# Patient Record
Sex: FEMALE | Race: OTHER | Employment: OTHER | URBAN - METROPOLITAN AREA
[De-identification: names, ages, dates, MRNs, and addresses within clinical notes are randomized per-mention and may not be internally consistent; named-entity substitution may affect disease eponyms.]

---

## 2021-09-27 ENCOUNTER — NEW PATIENT COMPREHENSIVE (OUTPATIENT)
Dept: URBAN - METROPOLITAN AREA CLINIC 39 | Facility: CLINIC | Age: 71
End: 2021-09-27

## 2021-09-27 DIAGNOSIS — H40.1134: ICD-10-CM

## 2021-09-27 DIAGNOSIS — H04.123: ICD-10-CM

## 2021-09-27 DIAGNOSIS — H25.813: ICD-10-CM

## 2021-09-27 DIAGNOSIS — H43.393: ICD-10-CM

## 2021-09-27 PROCEDURE — 92250 FUNDUS PHOTOGRAPHY W/I&R: CPT

## 2021-09-27 PROCEDURE — 92015 DETERMINE REFRACTIVE STATE: CPT

## 2021-09-27 PROCEDURE — 92004 COMPRE OPH EXAM NEW PT 1/>: CPT

## 2021-09-27 ASSESSMENT — VISUAL ACUITY
OS_SC: <J12
OS_CC: J6
OS_CC: 20/40
OS_SC: 20/200
OD_SC: 20/200
OD_CC: 20/50+1
OD_SC: <J12
OD_CC: J4

## 2021-09-27 ASSESSMENT — TONOMETRY
OD_IOP_MMHG: 15
OS_IOP_MMHG: 10

## 2021-10-15 ENCOUNTER — CATARACT CONSULT (OUTPATIENT)
Dept: URBAN - METROPOLITAN AREA CLINIC 39 | Facility: CLINIC | Age: 71
End: 2021-10-15

## 2021-10-15 DIAGNOSIS — H25.812: ICD-10-CM

## 2021-10-15 DIAGNOSIS — H18.513: ICD-10-CM

## 2021-10-15 DIAGNOSIS — Z83.511: ICD-10-CM

## 2021-10-15 DIAGNOSIS — H04.123: ICD-10-CM

## 2021-10-15 DIAGNOSIS — S05.12XS: ICD-10-CM

## 2021-10-15 DIAGNOSIS — H40.1132: ICD-10-CM

## 2021-10-15 DIAGNOSIS — H35.30: ICD-10-CM

## 2021-10-15 DIAGNOSIS — H43.393: ICD-10-CM

## 2021-10-15 DIAGNOSIS — H25.811: ICD-10-CM

## 2021-10-15 PROCEDURE — 92014 COMPRE OPH EXAM EST PT 1/>: CPT

## 2021-10-15 PROCEDURE — 92136TC INTERFEROMETRY - TECHNICAL COMPONENT

## 2021-10-15 PROCEDURE — 92025-3 CORNEAL TOPO, REFUSED

## 2021-10-15 PROCEDURE — 92134 CPTRZ OPH DX IMG PST SGM RTA: CPT

## 2021-10-15 PROCEDURE — 92286 ANT SGM IMG I&R SPECLR MIC: CPT

## 2021-10-15 RX ORDER — PREDNISOLONE ACETATE 10 MG/ML: 1 SUSPENSION/ DROPS OPHTHALMIC

## 2021-10-15 RX ORDER — KETOROLAC TROMETHAMINE 5 MG/ML: 1 SOLUTION OPHTHALMIC

## 2021-10-15 ASSESSMENT — TONOMETRY
OD_IOP_MMHG: 12
OS_IOP_MMHG: 10

## 2021-10-15 ASSESSMENT — VISUAL ACUITY
OS_CC: J6
OD_SC: <J12
OD_CC: J6
OS_BAT: 20/400
OD_RAM: 20/25
OS_SC: 20/400
OD_BAT: 20/400
OS_AM: 20/30
OS_CC: 20/50
OD_SC: 20/400
OD_CC: 20/50
OS_SC: <J12

## 2021-10-27 ENCOUNTER — PRE-OP/H&P (OUTPATIENT)
Dept: URBAN - METROPOLITAN AREA CLINIC 39 | Facility: CLINIC | Age: 71
End: 2021-10-27

## 2021-10-27 ENCOUNTER — SURGERY/PROCEDURE (OUTPATIENT)
Dept: URBAN - METROPOLITAN AREA CLINIC 39 | Facility: CLINIC | Age: 71
End: 2021-10-27

## 2021-10-27 DIAGNOSIS — H25.812: ICD-10-CM

## 2021-10-27 PROCEDURE — 99211T TECH SERVICE

## 2021-10-27 PROCEDURE — 66984 XCAPSL CTRC RMVL W/O ECP: CPT

## 2021-10-28 ENCOUNTER — POST OP/EVAL OF SECOND EYE (OUTPATIENT)
Dept: URBAN - METROPOLITAN AREA CLINIC 39 | Facility: CLINIC | Age: 71
End: 2021-10-28

## 2021-10-28 DIAGNOSIS — H35.30: ICD-10-CM

## 2021-10-28 DIAGNOSIS — H40.1132: ICD-10-CM

## 2021-10-28 DIAGNOSIS — H25.811: ICD-10-CM

## 2021-10-28 DIAGNOSIS — Z96.1: ICD-10-CM

## 2021-10-28 DIAGNOSIS — H18.513: ICD-10-CM

## 2021-10-28 DIAGNOSIS — H04.123: ICD-10-CM

## 2021-10-28 DIAGNOSIS — H43.393: ICD-10-CM

## 2021-10-28 PROCEDURE — 99024 POSTOP FOLLOW-UP VISIT: CPT

## 2021-10-28 PROCEDURE — 92012 INTRM OPH EXAM EST PATIENT: CPT

## 2021-10-28 ASSESSMENT — VISUAL ACUITY
OS_SC: 20/80
OD_BAT: 20/400
OS_SC: <J10
OD_SC: 20/400
OD_SC: <J12

## 2021-10-28 ASSESSMENT — TONOMETRY
OD_IOP_MMHG: 12
OS_IOP_MMHG: 13

## 2021-11-03 ENCOUNTER — SURGERY/PROCEDURE (OUTPATIENT)
Dept: URBAN - METROPOLITAN AREA CLINIC 39 | Facility: CLINIC | Age: 71
End: 2021-11-03

## 2021-11-03 ENCOUNTER — PRE-OP/H&P (OUTPATIENT)
Dept: URBAN - METROPOLITAN AREA CLINIC 39 | Facility: CLINIC | Age: 71
End: 2021-11-03

## 2021-11-03 DIAGNOSIS — Z96.1: ICD-10-CM

## 2021-11-03 DIAGNOSIS — H25.811: ICD-10-CM

## 2021-11-03 PROCEDURE — 66984 XCAPSL CTRC RMVL W/O ECP: CPT

## 2021-11-03 PROCEDURE — 99211T TECH SERVICE

## 2021-11-03 ASSESSMENT — TONOMETRY
OS_IOP_MMHG: 16
OD_IOP_MMHG: 14

## 2021-11-03 ASSESSMENT — VISUAL ACUITY
OD_SC: >J12
OD_SC: 20/400
OS_SC: J12
OS_SC: 20/50+1
OD_BAT: 20/400

## 2021-11-04 ENCOUNTER — CATARACT POST-OP 1-DAY (OUTPATIENT)
Dept: URBAN - METROPOLITAN AREA CLINIC 39 | Facility: CLINIC | Age: 71
End: 2021-11-04

## 2021-11-04 DIAGNOSIS — Z96.1: ICD-10-CM

## 2021-11-04 PROCEDURE — 99024 POSTOP FOLLOW-UP VISIT: CPT

## 2021-11-04 ASSESSMENT — VISUAL ACUITY
OS_SC: <J12
OD_SC: <J12
OD_SC: 20/400+1
OS_SC: 20/40

## 2021-11-04 ASSESSMENT — TONOMETRY
OD_IOP_MMHG: 10
OS_IOP_MMHG: 12

## 2021-12-03 ENCOUNTER — POST-OP CATARACT (OUTPATIENT)
Dept: URBAN - METROPOLITAN AREA CLINIC 39 | Facility: CLINIC | Age: 71
End: 2021-12-03

## 2021-12-03 DIAGNOSIS — Z96.1: ICD-10-CM

## 2021-12-03 PROCEDURE — 99024 POSTOP FOLLOW-UP VISIT: CPT

## 2021-12-03 ASSESSMENT — TONOMETRY
OS_IOP_MMHG: 11
OD_IOP_MMHG: 11

## 2021-12-03 ASSESSMENT — VISUAL ACUITY
OS_SC: 20/40
OD_SC: 20/50-1
OD_SC: J12
OU_SC: 20/30

## 2022-04-14 ENCOUNTER — FOLLOW UP (OUTPATIENT)
Dept: URBAN - METROPOLITAN AREA CLINIC 39 | Facility: CLINIC | Age: 72
End: 2022-04-14

## 2022-04-14 DIAGNOSIS — H02.403: ICD-10-CM

## 2022-04-14 DIAGNOSIS — H04.123: ICD-10-CM

## 2022-04-14 DIAGNOSIS — H40.1132: ICD-10-CM

## 2022-04-14 PROCEDURE — 92012 INTRM OPH EXAM EST PATIENT: CPT

## 2022-04-14 PROCEDURE — 92083 EXTENDED VISUAL FIELD XM: CPT

## 2022-04-14 RX ORDER — BRIMONIDINE TARTRATE, TIMOLOL MALEATE 2; 5 MG/ML; MG/ML: 1 SOLUTION/ DROPS OPHTHALMIC TWICE A DAY

## 2022-04-14 ASSESSMENT — TONOMETRY
OD_IOP_MMHG: 16
OS_IOP_MMHG: 12
OS_IOP_MMHG: 15
OD_IOP_MMHG: 11

## 2022-04-14 ASSESSMENT — VISUAL ACUITY
OS_CC: 20/30-2
OD_CC: 20/40-1

## 2022-04-20 ENCOUNTER — FOLLOW UP (OUTPATIENT)
Dept: URBAN - METROPOLITAN AREA CLINIC 38 | Facility: CLINIC | Age: 72
End: 2022-04-20

## 2022-05-16 ENCOUNTER — ESTABLISHED PATIENT (OUTPATIENT)
Dept: URBAN - METROPOLITAN AREA CLINIC 39 | Facility: CLINIC | Age: 72
End: 2022-05-16

## 2022-05-16 DIAGNOSIS — H02.403: ICD-10-CM

## 2022-05-16 DIAGNOSIS — H02.832: ICD-10-CM

## 2022-05-16 DIAGNOSIS — H02.835: ICD-10-CM

## 2022-05-16 PROCEDURE — 92285 EXTERNAL OCULAR PHOTOGRAPHY: CPT

## 2022-05-16 PROCEDURE — 99213 OFFICE O/P EST LOW 20 MIN: CPT

## 2022-05-16 ASSESSMENT — VISUAL ACUITY
OD_CC: 20/40-1
OS_CC: 20/30-2

## 2022-06-06 ENCOUNTER — TECH ONLY (OUTPATIENT)
Dept: URBAN - METROPOLITAN AREA CLINIC 39 | Facility: CLINIC | Age: 72
End: 2022-06-06

## 2022-06-06 DIAGNOSIS — H02.403: ICD-10-CM

## 2022-06-06 DIAGNOSIS — H04.123: ICD-10-CM

## 2022-06-06 DIAGNOSIS — S05.12XS: ICD-10-CM

## 2022-06-06 DIAGNOSIS — H02.832: ICD-10-CM

## 2022-06-06 DIAGNOSIS — H02.835: ICD-10-CM

## 2022-06-06 DIAGNOSIS — H40.1132: ICD-10-CM

## 2022-06-06 PROCEDURE — 99211T TECH SERVICE

## 2022-06-06 PROCEDURE — 92082 INTERMEDIATE VISUAL FIELD XM: CPT

## 2022-06-08 ENCOUNTER — FOLLOW UP (OUTPATIENT)
Dept: URBAN - METROPOLITAN AREA CLINIC 39 | Facility: CLINIC | Age: 72
End: 2022-06-08

## 2022-06-08 DIAGNOSIS — S05.12XS: ICD-10-CM

## 2022-06-08 DIAGNOSIS — H40.1132: ICD-10-CM

## 2022-06-08 DIAGNOSIS — H04.123: ICD-10-CM

## 2022-06-08 PROCEDURE — 92012 INTRM OPH EXAM EST PATIENT: CPT

## 2022-06-08 ASSESSMENT — TONOMETRY
OD_IOP_MMHG: 16
OS_IOP_MMHG: 15
OS_IOP_MMHG: 16

## 2022-06-08 ASSESSMENT — VISUAL ACUITY
OU_CC: 20/25
OS_CC: 20/25-1
OD_CC: 20/30-1

## 2022-08-03 ENCOUNTER — PRE-OP/H&P (OUTPATIENT)
Dept: URBAN - METROPOLITAN AREA CLINIC 39 | Facility: CLINIC | Age: 72
End: 2022-08-03

## 2022-08-03 DIAGNOSIS — H02.403: ICD-10-CM

## 2022-08-03 PROCEDURE — 99211HP H&P OFFICE/OUTPATIENT VISIT, EST

## 2022-08-18 ENCOUNTER — POST-OP (OUTPATIENT)
Dept: URBAN - METROPOLITAN AREA CLINIC 39 | Facility: CLINIC | Age: 72
End: 2022-08-18

## 2022-08-18 DIAGNOSIS — H02.403: ICD-10-CM

## 2022-08-18 DIAGNOSIS — H04.123: ICD-10-CM

## 2022-08-18 DIAGNOSIS — H02.835: ICD-10-CM

## 2022-08-18 DIAGNOSIS — H02.832: ICD-10-CM

## 2022-08-18 DIAGNOSIS — H40.1132: ICD-10-CM

## 2022-08-18 PROCEDURE — 92012 INTRM OPH EXAM EST PATIENT: CPT

## 2022-08-18 PROCEDURE — 92083 EXTENDED VISUAL FIELD XM: CPT

## 2022-08-18 ASSESSMENT — VISUAL ACUITY
OU_CC: 20/30
OD_CC: 20/30
OS_CC: 20/30

## 2022-08-18 ASSESSMENT — TONOMETRY
OD_IOP_MMHG: 14
OS_IOP_MMHG: 13
OD_IOP_MMHG: 13

## 2022-08-30 ENCOUNTER — PRE-OP/H&P (OUTPATIENT)
Dept: URBAN - METROPOLITAN AREA SURGERY 14 | Facility: SURGERY | Age: 72
End: 2022-08-30

## 2022-08-30 ENCOUNTER — SURGERY/PROCEDURE (OUTPATIENT)
Dept: URBAN - METROPOLITAN AREA SURGERY 14 | Facility: SURGERY | Age: 72
End: 2022-08-30

## 2022-08-30 DIAGNOSIS — H04.123: ICD-10-CM

## 2022-08-30 DIAGNOSIS — H02.403: ICD-10-CM

## 2022-08-30 DIAGNOSIS — S05.12XS: ICD-10-CM

## 2022-08-30 PROCEDURE — 6790450 REPAIR EYELID DEFECT

## 2022-08-30 PROCEDURE — 99211T TECH SERVICE

## 2022-09-07 ENCOUNTER — POST-OP (OUTPATIENT)
Dept: URBAN - METROPOLITAN AREA CLINIC 35 | Facility: CLINIC | Age: 72
End: 2022-09-07

## 2022-09-07 DIAGNOSIS — Z98.890: ICD-10-CM

## 2022-09-07 DIAGNOSIS — S05.12XS: ICD-10-CM

## 2022-09-07 DIAGNOSIS — H02.835: ICD-10-CM

## 2022-09-07 DIAGNOSIS — H02.832: ICD-10-CM

## 2022-09-07 PROCEDURE — 99024 POSTOP FOLLOW-UP VISIT: CPT

## 2022-09-07 PROCEDURE — 92285 EXTERNAL OCULAR PHOTOGRAPHY: CPT

## 2022-09-07 ASSESSMENT — VISUAL ACUITY
OD_CC: 20/40
OS_CC: 20/40

## 2022-10-07 ENCOUNTER — EMERGENCY VISIT (OUTPATIENT)
Dept: URBAN - METROPOLITAN AREA CLINIC 35 | Facility: CLINIC | Age: 72
End: 2022-10-07

## 2022-10-07 DIAGNOSIS — S05.12XS: ICD-10-CM

## 2022-10-07 DIAGNOSIS — S05.02XA: ICD-10-CM

## 2022-10-07 PROCEDURE — 92012 INTRM OPH EXAM EST PATIENT: CPT

## 2022-10-07 RX ORDER — AMOXICILLIN 500 MG/1
1 CAPSULE ORAL
Start: 2022-10-07

## 2022-10-07 ASSESSMENT — TONOMETRY
OS_IOP_MMHG: 13
OD_IOP_MMHG: 13

## 2022-10-07 ASSESSMENT — VISUAL ACUITY
OS_CC: 20/60
OU_CC: 20/40
OD_CC: 20/40

## 2022-10-11 ENCOUNTER — FOLLOW UP (OUTPATIENT)
Dept: URBAN - METROPOLITAN AREA CLINIC 35 | Facility: CLINIC | Age: 72
End: 2022-10-11

## 2022-10-11 DIAGNOSIS — S05.12XS: ICD-10-CM

## 2022-10-11 DIAGNOSIS — S05.02XD: ICD-10-CM

## 2022-10-11 PROCEDURE — 92012 INTRM OPH EXAM EST PATIENT: CPT

## 2022-10-11 ASSESSMENT — VISUAL ACUITY
OD_CC: 20/40
OU_CC: 20/40
OS_CC: 20/50-1

## 2022-12-28 ENCOUNTER — FOLLOW UP (OUTPATIENT)
Dept: URBAN - METROPOLITAN AREA CLINIC 39 | Facility: CLINIC | Age: 72
End: 2022-12-28

## 2022-12-28 PROCEDURE — 92133 CPTRZD OPH DX IMG PST SGM ON: CPT

## 2022-12-28 PROCEDURE — 92012 INTRM OPH EXAM EST PATIENT: CPT

## 2022-12-28 ASSESSMENT — TONOMETRY
OS_IOP_MMHG: 16
OD_IOP_MMHG: 16

## 2022-12-28 ASSESSMENT — VISUAL ACUITY
OD_SC: 20/30-1
OS_SC: 20/30-2

## 2023-07-28 ENCOUNTER — EMERGENCY VISIT (OUTPATIENT)
Dept: URBAN - METROPOLITAN AREA CLINIC 39 | Facility: CLINIC | Age: 73
End: 2023-07-28

## 2023-07-28 DIAGNOSIS — H02.88B: ICD-10-CM

## 2023-07-28 DIAGNOSIS — H43.393: ICD-10-CM

## 2023-07-28 DIAGNOSIS — H02.88A: ICD-10-CM

## 2023-07-28 DIAGNOSIS — H40.1132: ICD-10-CM

## 2023-07-28 DIAGNOSIS — H43.812: ICD-10-CM

## 2023-07-28 DIAGNOSIS — H04.123: ICD-10-CM

## 2023-07-28 PROCEDURE — 92250 FUNDUS PHOTOGRAPHY W/I&R: CPT

## 2023-07-28 PROCEDURE — 92014 COMPRE OPH EXAM EST PT 1/>: CPT

## 2023-07-28 ASSESSMENT — TONOMETRY
OD_IOP_MMHG: 10
OS_IOP_MMHG: 10

## 2023-07-28 ASSESSMENT — VISUAL ACUITY
OD_CC: 20/25-2
OS_CC: 20/30
OU_CC: 20/25-1

## 2023-08-17 ENCOUNTER — COMPREHENSIVE EXAM (OUTPATIENT)
Dept: URBAN - METROPOLITAN AREA CLINIC 39 | Facility: CLINIC | Age: 73
End: 2023-08-17

## 2023-08-17 DIAGNOSIS — H40.1132: ICD-10-CM

## 2023-08-17 DIAGNOSIS — H18.513: ICD-10-CM

## 2023-08-17 DIAGNOSIS — H35.30: ICD-10-CM

## 2023-08-17 DIAGNOSIS — H43.812: ICD-10-CM

## 2023-08-17 DIAGNOSIS — H02.88B: ICD-10-CM

## 2023-08-17 DIAGNOSIS — H04.123: ICD-10-CM

## 2023-08-17 DIAGNOSIS — H43.393: ICD-10-CM

## 2023-08-17 DIAGNOSIS — H02.88A: ICD-10-CM

## 2023-08-17 DIAGNOSIS — H26.492: ICD-10-CM

## 2023-08-17 PROCEDURE — 92015 DETERMINE REFRACTIVE STATE: CPT

## 2023-08-17 PROCEDURE — 92250 FUNDUS PHOTOGRAPHY W/I&R: CPT

## 2023-08-17 PROCEDURE — 92014 COMPRE OPH EXAM EST PT 1/>: CPT

## 2023-08-17 RX ORDER — LIFITEGRAST 50 MG/ML: 1 SOLUTION/ DROPS OPHTHALMIC TWICE A DAY

## 2023-08-17 ASSESSMENT — TONOMETRY
OD_IOP_MMHG: 15
OS_IOP_MMHG: 14

## 2023-08-17 ASSESSMENT — VISUAL ACUITY
OD_CC: J5
OS_CC: J5
OU_CC: J5
OS_CC: 20/40
OD_CC: 20/30
OU_CC: 20/30

## 2023-10-25 ENCOUNTER — CONSULTATION/EVALUATION (OUTPATIENT)
Dept: URBAN - METROPOLITAN AREA CLINIC 39 | Facility: CLINIC | Age: 73
End: 2023-10-25

## 2023-10-25 ENCOUNTER — SURGERY/PROCEDURE (OUTPATIENT)
Dept: URBAN - METROPOLITAN AREA SURGERY 14 | Facility: SURGERY | Age: 73
End: 2023-10-25

## 2023-10-25 DIAGNOSIS — H26.492: ICD-10-CM

## 2023-10-25 DIAGNOSIS — H40.1132: ICD-10-CM

## 2023-10-25 DIAGNOSIS — H43.812: ICD-10-CM

## 2023-10-25 DIAGNOSIS — H04.123: ICD-10-CM

## 2023-10-25 PROCEDURE — 66821 AFTER CATARACT LASER SURGERY: CPT

## 2023-10-25 PROCEDURE — 92014 COMPRE OPH EXAM EST PT 1/>: CPT | Mod: 57

## 2023-10-25 ASSESSMENT — TONOMETRY: OS_IOP_MMHG: 14

## 2023-10-25 ASSESSMENT — VISUAL ACUITY: OS_CC: 20/30

## 2023-11-01 ENCOUNTER — POST-OP (OUTPATIENT)
Dept: URBAN - METROPOLITAN AREA CLINIC 39 | Facility: CLINIC | Age: 73
End: 2023-11-01

## 2023-11-01 DIAGNOSIS — Z98.890: ICD-10-CM

## 2023-11-01 PROCEDURE — 99024 POSTOP FOLLOW-UP VISIT: CPT

## 2023-11-01 ASSESSMENT — VISUAL ACUITY
OS_CC: 20/30
OD_CC: 20/30
OU_CC: 20/20-1

## 2023-11-01 ASSESSMENT — TONOMETRY
OS_IOP_MMHG: 15
OD_IOP_MMHG: 14

## 2024-03-07 ENCOUNTER — APPOINTMENT (RX ONLY)
Dept: URBAN - METROPOLITAN AREA CLINIC 130 | Facility: CLINIC | Age: 74
Setting detail: DERMATOLOGY
End: 2024-03-07

## 2024-03-07 DIAGNOSIS — L73.9 FOLLICULAR DISORDER, UNSPECIFIED: ICD-10-CM

## 2024-03-07 DIAGNOSIS — L738 OTHER SPECIFIED DISEASES OF HAIR AND HAIR FOLLICLES: ICD-10-CM

## 2024-03-07 DIAGNOSIS — L663 OTHER SPECIFIED DISEASES OF HAIR AND HAIR FOLLICLES: ICD-10-CM

## 2024-03-07 PROBLEM — L02.821 FURUNCLE OF HEAD [ANY PART, EXCEPT FACE]: Status: ACTIVE | Noted: 2024-03-07

## 2024-03-07 PROBLEM — L02.426 FURUNCLE OF LEFT LOWER LIMB: Status: ACTIVE | Noted: 2024-03-07

## 2024-03-07 PROCEDURE — ? PRESCRIPTION

## 2024-03-07 PROCEDURE — ? PRESCRIPTION MEDICATION MANAGEMENT

## 2024-03-07 PROCEDURE — 99203 OFFICE O/P NEW LOW 30 MIN: CPT

## 2024-03-07 PROCEDURE — ? OTC TREATMENT REGIMEN

## 2024-03-07 PROCEDURE — ? COUNSELING

## 2024-03-07 RX ORDER — TRIAMCINOLONE ACETONIDE 1 MG/G
OINTMENT TOPICAL BID
Qty: 30 | Refills: 1 | Status: ERX | COMMUNITY
Start: 2024-03-07

## 2024-03-07 RX ORDER — CLINDAMYCIN PHOSPHATE 10 MG/ML
1 SOLUTION TOPICAL
Qty: 60 | Refills: 3 | Status: ERX | COMMUNITY
Start: 2024-03-07

## 2024-03-07 RX ADMIN — TRIAMCINOLONE ACETONIDE: 1 OINTMENT TOPICAL at 00:00

## 2024-03-07 RX ADMIN — CLINDAMYCIN PHOSPHATE 1: 10 SOLUTION TOPICAL at 00:00

## 2024-03-07 ASSESSMENT — LOCATION SIMPLE DESCRIPTION DERM
LOCATION SIMPLE: LEFT THIGH
LOCATION SIMPLE: POSTERIOR SCALP
LOCATION SIMPLE: LEFT POSTERIOR THIGH

## 2024-03-07 ASSESSMENT — LOCATION DETAILED DESCRIPTION DERM
LOCATION DETAILED: LEFT ANTERIOR DISTAL THIGH
LOCATION DETAILED: LEFT DISTAL POSTERIOR THIGH
LOCATION DETAILED: RIGHT INFERIOR OCCIPITAL SCALP
LOCATION DETAILED: LEFT OCCIPITAL SCALP
LOCATION DETAILED: LEFT INFERIOR OCCIPITAL SCALP

## 2024-03-07 ASSESSMENT — LOCATION ZONE DERM
LOCATION ZONE: LEG
LOCATION ZONE: SCALP

## 2024-03-07 NOTE — PROCEDURE: MIPS QUALITY
Name And Contact Information For Health Care Proxy: Kenya Mann [phone]
Quality 47: Advance Care Plan: Advance Care Planning discussed and documented; advance care plan or surrogate decision maker documented in the medical record.
Detail Level: Detailed

## 2024-03-07 NOTE — PROCEDURE: OTC TREATMENT REGIMEN
Detail Level: Zone
Patient Specific Otc Recommendations (Will Not Stick From Patient To Patient): Hibiclens wash daily to affected areas on body\\nAllegra 180mg daily\\nSarna Sensitive nightly PRN for itching

## 2024-03-07 NOTE — PROCEDURE: PRESCRIPTION MEDICATION MANAGEMENT
Initiate Treatment: .\\nFOR PRURIGO NODULES: triamcinolone acetonide 0.1 % topical ointment Bid\\nSig: Apply small amount to affected area twice daily up to 5days per week (Monday-Friday). As needed for flares.\\n\\nFOR FLARES OF FOLLICULITIS: clindamycin phosphate 1 % topical solution 1-2 times daily\\nSig: Apply small amount to affected areas on scalp,  1-2 times daily, prn for flares.
Detail Level: Zone
Render In Strict Bullet Format?: No

## 2024-05-07 ENCOUNTER — FOLLOW UP (OUTPATIENT)
Dept: URBAN - METROPOLITAN AREA CLINIC 39 | Facility: CLINIC | Age: 74
End: 2024-05-07

## 2024-05-07 DIAGNOSIS — H04.123: ICD-10-CM

## 2024-05-07 DIAGNOSIS — R51.9: ICD-10-CM

## 2024-05-07 DIAGNOSIS — H57.12: ICD-10-CM

## 2024-05-07 DIAGNOSIS — H40.1132: ICD-10-CM

## 2024-05-07 PROCEDURE — 92012 INTRM OPH EXAM EST PATIENT: CPT

## 2024-05-07 PROCEDURE — 92083 EXTENDED VISUAL FIELD XM: CPT

## 2024-05-07 ASSESSMENT — VISUAL ACUITY
OS_CC: 20/25
OD_CC: 20/25-1
OU_CC: 20/20

## 2024-05-07 ASSESSMENT — TONOMETRY
OD_IOP_MMHG: 13
OS_IOP_MMHG: 13

## 2025-05-27 ENCOUNTER — COMPREHENSIVE EXAM (OUTPATIENT)
Age: 75
End: 2025-05-27

## 2025-05-27 DIAGNOSIS — H35.30: ICD-10-CM

## 2025-05-27 DIAGNOSIS — H43.812: ICD-10-CM

## 2025-05-27 DIAGNOSIS — H02.88B: ICD-10-CM

## 2025-05-27 DIAGNOSIS — H52.4: ICD-10-CM

## 2025-05-27 DIAGNOSIS — H02.88A: ICD-10-CM

## 2025-05-27 DIAGNOSIS — R51.9: ICD-10-CM

## 2025-05-27 DIAGNOSIS — H57.12: ICD-10-CM

## 2025-05-27 DIAGNOSIS — H04.123: ICD-10-CM

## 2025-05-27 DIAGNOSIS — H40.1132: ICD-10-CM

## 2025-05-27 PROCEDURE — 92134 CPTRZ OPH DX IMG PST SGM RTA: CPT

## 2025-05-27 PROCEDURE — 92014 COMPRE OPH EXAM EST PT 1/>: CPT

## 2025-05-27 PROCEDURE — 92015 DETERMINE REFRACTIVE STATE: CPT

## 2025-08-08 ENCOUNTER — TECH ONLY (OUTPATIENT)
Age: 75
End: 2025-08-08

## 2025-08-08 DIAGNOSIS — H02.88A: ICD-10-CM

## 2025-08-08 DIAGNOSIS — H02.88B: ICD-10-CM

## 2025-08-08 DIAGNOSIS — H57.12: ICD-10-CM

## 2025-08-08 DIAGNOSIS — H04.123: ICD-10-CM

## 2025-08-08 DIAGNOSIS — R51.9: ICD-10-CM

## 2025-08-08 DIAGNOSIS — H40.1132: ICD-10-CM

## 2025-08-08 DIAGNOSIS — H52.4: ICD-10-CM

## 2025-08-08 PROCEDURE — 99211T TECH SERVICE

## 2025-08-08 PROCEDURE — 92083 EXTENDED VISUAL FIELD XM: CPT
